# Patient Record
Sex: FEMALE | Race: WHITE | NOT HISPANIC OR LATINO
[De-identification: names, ages, dates, MRNs, and addresses within clinical notes are randomized per-mention and may not be internally consistent; named-entity substitution may affect disease eponyms.]

---

## 2017-05-18 ENCOUNTER — TRANSCRIPTION ENCOUNTER (OUTPATIENT)
Age: 52
End: 2017-05-18

## 2017-05-21 ENCOUNTER — APPOINTMENT (OUTPATIENT)
Dept: MRI IMAGING | Facility: CLINIC | Age: 52
End: 2017-05-21

## 2017-05-21 ENCOUNTER — OUTPATIENT (OUTPATIENT)
Dept: OUTPATIENT SERVICES | Facility: HOSPITAL | Age: 52
LOS: 1 days | End: 2017-05-21
Payer: COMMERCIAL

## 2017-05-21 DIAGNOSIS — Z00.8 ENCOUNTER FOR OTHER GENERAL EXAMINATION: ICD-10-CM

## 2017-05-21 PROCEDURE — 72156 MRI NECK SPINE W/O & W/DYE: CPT

## 2017-05-21 PROCEDURE — A9585: CPT

## 2017-05-21 PROCEDURE — 70553 MRI BRAIN STEM W/O & W/DYE: CPT

## 2017-05-22 ENCOUNTER — APPOINTMENT (OUTPATIENT)
Dept: MRI IMAGING | Facility: CLINIC | Age: 52
End: 2017-05-22

## 2017-05-22 ENCOUNTER — OUTPATIENT (OUTPATIENT)
Dept: OUTPATIENT SERVICES | Facility: HOSPITAL | Age: 52
LOS: 1 days | End: 2017-05-22
Payer: COMMERCIAL

## 2017-05-22 DIAGNOSIS — Z00.8 ENCOUNTER FOR OTHER GENERAL EXAMINATION: ICD-10-CM

## 2017-05-22 PROCEDURE — A9585: CPT

## 2017-05-22 PROCEDURE — 72157 MRI CHEST SPINE W/O & W/DYE: CPT

## 2017-05-22 PROCEDURE — 82565 ASSAY OF CREATININE: CPT

## 2017-05-30 DIAGNOSIS — M50.221 OTHER CERVICAL DISC DISPLACEMENT AT C4-C5 LEVEL: ICD-10-CM

## 2017-05-30 DIAGNOSIS — G35 MULTIPLE SCLEROSIS: ICD-10-CM

## 2017-05-30 DIAGNOSIS — G93.9 DISORDER OF BRAIN, UNSPECIFIED: ICD-10-CM

## 2017-11-27 ENCOUNTER — APPOINTMENT (OUTPATIENT)
Dept: MRI IMAGING | Facility: CLINIC | Age: 52
End: 2017-11-27
Payer: MEDICAID

## 2017-11-27 ENCOUNTER — OUTPATIENT (OUTPATIENT)
Dept: OUTPATIENT SERVICES | Facility: HOSPITAL | Age: 52
LOS: 1 days | End: 2017-11-27
Payer: COMMERCIAL

## 2017-11-27 DIAGNOSIS — Z00.8 ENCOUNTER FOR OTHER GENERAL EXAMINATION: ICD-10-CM

## 2017-11-27 PROCEDURE — 70551 MRI BRAIN STEM W/O DYE: CPT

## 2017-11-27 PROCEDURE — 70551 MRI BRAIN STEM W/O DYE: CPT | Mod: 26

## 2018-02-12 ENCOUNTER — APPOINTMENT (OUTPATIENT)
Dept: MRI IMAGING | Facility: CLINIC | Age: 53
End: 2018-02-12

## 2021-11-03 ENCOUNTER — TRANSCRIPTION ENCOUNTER (OUTPATIENT)
Age: 56
End: 2021-11-03

## 2022-09-22 ENCOUNTER — NON-APPOINTMENT (OUTPATIENT)
Age: 57
End: 2022-09-22

## 2022-09-26 ENCOUNTER — APPOINTMENT (OUTPATIENT)
Dept: ORTHOPEDIC SURGERY | Facility: CLINIC | Age: 57
End: 2022-09-26

## 2022-09-26 ENCOUNTER — NON-APPOINTMENT (OUTPATIENT)
Age: 57
End: 2022-09-26

## 2022-09-26 VITALS
BODY MASS INDEX: 29.86 KG/M2 | SYSTOLIC BLOOD PRESSURE: 134 MMHG | HEART RATE: 78 BPM | HEIGHT: 68 IN | WEIGHT: 197 LBS | OXYGEN SATURATION: 98 % | DIASTOLIC BLOOD PRESSURE: 84 MMHG

## 2022-09-26 PROCEDURE — 73564 X-RAY EXAM KNEE 4 OR MORE: CPT | Mod: 26,RT

## 2022-09-26 PROCEDURE — 99204 OFFICE O/P NEW MOD 45 MIN: CPT

## 2022-09-26 NOTE — HISTORY OF PRESENT ILLNESS
[Stable] : stable [3] : a current pain level of 3/10 [4] : an average pain level of 4/10 [2] : a minimum pain level of 2/10 [5] : a maximum pain level of 5/10 [Bending] : worsened by bending [Walking] : worsened by walking [de-identified] : ETIENNE KEENAN is a 57 year female being seen for initial visit R knee pain. She reports Friday she stepped up onto a curb and felt a pop in her R knee. Currently, she reports severe lateral right knee pain.  She reports going to an urgent care facility and she has been taking meloxicam and cyclobenzaprine without much improvement.  She states she is having difficulty and inability to fully extend or fully flex her knee.  She feels as though something is blocking her.  She states she has had a history of about a year of intermittent pain in her knee with transcending stairs however this pain is much different than what she has felt in the past.  She denies instability or buckling.  She works as a hairdresser

## 2022-09-26 NOTE — DISCUSSION/SUMMARY
[de-identified] : I had a lengthy discussion with the patient regarding their current condition. We discussed the treatment options including operative and nonoperative management. At this time I recommended a stat MRI of her right knee to rule out a bucket-handle tear of the lateral meniscus.  We discussed arthroscopic surgery should her MRI show a bucket-handle lateral meniscus.  We also discussed the potential for cortisone injection in the future depending on the results of the MRI.  She has a supportive knee brace which she can continue to use.  She can continue with meloxicam and cyclobenzaprine.  She will follow-up in office after results of her MRI.  All questions were answered.

## 2022-09-26 NOTE — PHYSICAL EXAM
[de-identified] : Physical Exam: \par General: Well appearing, no acute distress \par Neurologic: A&Ox3, No focal deficits \par Head: NCAT without abrasions, lacerations, or ecchymosis to head, face, or scalp \par Eyes: No scleral icterus, no gross abnormalities \par Respiratory: Equal chest wall expansion bilaterally, no accessory muscle use \par Lymphatic: No lymphadenopathy palpated \par Skin: Warm and dry \par Psychiatric: Normal mood and affect\par \par Examination of the right knee reveals no significant effusion, erythema or ecchymosis.  There are no leg length discrepancies appreciated. The right knee is valgus alignment compared to the left.  The patient's range of motion is from 5-95° limited by pain with crepitus through the motion.  The patient has no pain with patella mobility or apprehension.  The patient displays tenderness to palpation in the medial and lateral joint lines but more so in the lateral posterior lateral joint line.  There is no patella facet tenderness.  The patient has a 4.5 out of 5 strength resisted straight leg raising as well as knee flexion and extension.  The knee is stable to Lachman testing, as well as anterior and posterior drawer.  There is no valgus or varus instability. The patient has pain with Monik and Grind Testing.  The calf and thigh are soft, supple and nontender.  The patient is grossly neurovascularly intact distally.\par \par Examination of the left knee reveals no significant effusion, erythema or ecchymosis.  There are no leg length discrepancies appreciated. The patient's range of motion is from 0-130°.  The knee is stable to Lachman testing, as well as anterior and posterior drawer.  There is no valgus or varus instability. The calf and thigh are soft, supple and nontender.  The patient is grossly neurovascularly intact distally. [de-identified] : 4 views of R knee were performed today and available for me to review. Results were discussed with the patient. They demonstrate no f/x, dislocation or other deformity.  There is severe patellofemoral arthritis noted with subluxation of the patella and osteophytes noted.  The medial and lateral joint spaces appear preserved.\par

## 2022-10-04 ENCOUNTER — APPOINTMENT (OUTPATIENT)
Dept: MRI IMAGING | Facility: CLINIC | Age: 57
End: 2022-10-04

## 2022-10-04 ENCOUNTER — OUTPATIENT (OUTPATIENT)
Dept: OUTPATIENT SERVICES | Facility: HOSPITAL | Age: 57
LOS: 1 days | End: 2022-10-04
Payer: COMMERCIAL

## 2022-10-04 DIAGNOSIS — S83.281A OTHER TEAR OF LATERAL MENISCUS, CURRENT INJURY, RIGHT KNEE, INITIAL ENCOUNTER: ICD-10-CM

## 2022-10-04 PROCEDURE — 73721 MRI JNT OF LWR EXTRE W/O DYE: CPT | Mod: 26,RT

## 2022-10-04 PROCEDURE — 73721 MRI JNT OF LWR EXTRE W/O DYE: CPT

## 2022-10-05 ENCOUNTER — APPOINTMENT (OUTPATIENT)
Dept: ORTHOPEDIC SURGERY | Facility: CLINIC | Age: 57
End: 2022-10-05

## 2022-10-05 DIAGNOSIS — M17.11 UNILATERAL PRIMARY OSTEOARTHRITIS, RIGHT KNEE: ICD-10-CM

## 2022-10-05 DIAGNOSIS — S83.281A OTHER TEAR OF LATERAL MENISCUS, CURRENT INJURY, RIGHT KNEE, INITIAL ENCOUNTER: ICD-10-CM

## 2022-10-05 PROCEDURE — 20611 DRAIN/INJ JOINT/BURSA W/US: CPT | Mod: RT

## 2022-10-05 PROCEDURE — 99214 OFFICE O/P EST MOD 30 MIN: CPT | Mod: 25

## 2022-10-05 NOTE — PROCEDURE
[de-identified] : Injection: US guided Right knee joint.\par \par A discussion was had with the patient regarding this procedure and all questions were answered. All risks, benefits and alternatives were discussed. These included but were not limited to bleeding, infection, and allergic reaction. Alcohol was used to clean the skin, and chlorhexidine was used to sterilize and prep the area in the supero-lateral aspect of the right knee. Ethyl chloride spray was then used as a topical anesthetic. A 20-gauge needle was used to aspirate 30 cc of clear yellow synovial fluid, switching syringes the right knee was then injected  3cc of 1% Xylocaine, 2cc of 0.25% Bupivacaine and 1cc of 40mg/mL Triamcinolone Acetonide into the right knee. Ultrasound guidance was used for adequate placement of needle.  A sterile band aid was then applied. The patient tolerated the procedure well and there were no complications. Post injection instructions were given.

## 2022-10-05 NOTE — DISCUSSION/SUMMARY
[de-identified] : I had a lengthy discussion with the patient regarding their current condition. We discussed the treatment options including operative and nonoperative management. At this time I recommended conservative management.  She is leaving for vacation next week.  I offered her cortisone injection for symptomatic relief as well as an aspiration.  She would like to proceed.  We discussed arthroscopy in the future as well as the significant amount of patellofemoral arthritis she has.  She will follow-up with us as her symptoms dictate.  All questions were answered.

## 2022-10-05 NOTE — PHYSICAL EXAM
[de-identified] : Physical Exam: \par General: Well appearing, no acute distress \par Neurologic: A&Ox3, No focal deficits \par Head: NCAT without abrasions, lacerations, or ecchymosis to head, face, or scalp \par Eyes: No scleral icterus, no gross abnormalities \par Respiratory: Equal chest wall expansion bilaterally, no accessory muscle use \par Lymphatic: No lymphadenopathy palpated \par Skin: Warm and dry \par Psychiatric: Normal mood and affect\par \par Examination of the right knee reveals no significant effusion, erythema or ecchymosis.  There are no leg length discrepancies appreciated. The right knee is valgus alignment compared to the left.  The patient's range of motion is from 0-105° limited by pain with crepitus through the motion.  The patient has no pain with patella mobility or apprehension.  The patient displays tenderness to palpation in the medial and lateral joint lines but more so in the lateral posterior lateral joint line.  There is no patella facet tenderness.  The patient has a 4.5 out of 5 strength resisted straight leg raising as well as knee flexion and extension.  The knee is stable to Lachman testing, as well as anterior and posterior drawer.  There is no valgus or varus instability. The patient has pain with Monik and Grind Testing.  The calf and thigh are soft, supple and nontender.  The patient is grossly neurovascularly intact distally.\par \par Examination of the left knee reveals no significant effusion, erythema or ecchymosis.  There are no leg length discrepancies appreciated. The patient's range of motion is from 0-130°.  The knee is stable to Lachman testing, as well as anterior and posterior drawer.  There is no valgus or varus instability. The calf and thigh are soft, supple and nontender.  The patient is grossly neurovascularly intact distally. [de-identified] : MRI of R knee informally reviewed and reviewed with Dr. Ocasio, as no report was available yet.  This reveals evidence of a lateral meniscus tear without a displaced bucket-handle fragment.  There is a moderate effusion.  There is patellofemoral arthritis with significant cartilage wear and osteophyte noted on the superior pole the patella.  Medial compartment does not show any significant meniscal tearing.

## 2022-10-05 NOTE — HISTORY OF PRESENT ILLNESS
[Stable] : stable [3] : a current pain level of 3/10 [4] : an average pain level of 4/10 [2] : a minimum pain level of 2/10 [5] : a maximum pain level of 5/10 [Bending] : worsened by bending [Walking] : worsened by walking [de-identified] : ETIENNE KEENAN is a 57 year female being seen for f/u visit R knee pain.  She reports her pain is slowly improving.  She is now able to fully extend her knee.  She has been using a brace.  She localized the pain to the lateral aspect of her knee.  She injured himself when she twisted on a sidewalk.

## 2022-10-13 ENCOUNTER — APPOINTMENT (OUTPATIENT)
Dept: ORTHOPEDIC SURGERY | Facility: CLINIC | Age: 57
End: 2022-10-13